# Patient Record
Sex: FEMALE | Race: OTHER
[De-identification: names, ages, dates, MRNs, and addresses within clinical notes are randomized per-mention and may not be internally consistent; named-entity substitution may affect disease eponyms.]

---

## 2017-07-02 ENCOUNTER — HOSPITAL ENCOUNTER (EMERGENCY)
Dept: HOSPITAL 61 - ER | Age: 22
Discharge: HOME | End: 2017-07-02
Payer: SELF-PAY

## 2017-07-02 VITALS — SYSTOLIC BLOOD PRESSURE: 119 MMHG | DIASTOLIC BLOOD PRESSURE: 69 MMHG

## 2017-07-02 VITALS — BODY MASS INDEX: 32.96 KG/M2 | WEIGHT: 210 LBS | HEIGHT: 67 IN

## 2017-07-02 DIAGNOSIS — R50.9: ICD-10-CM

## 2017-07-02 DIAGNOSIS — A09: Primary | ICD-10-CM

## 2017-07-02 LAB
ALBUMIN SERPL-MCNC: 3.5 G/DL (ref 3.4–5)
ALBUMIN/GLOB SERPL: 0.9 {RATIO} (ref 1–1.7)
ALP SERPL-CCNC: 57 U/L (ref 46–116)
ALT SERPL-CCNC: 28 U/L (ref 14–59)
ANION GAP SERPL CALC-SCNC: 8 MMOL/L (ref 6–14)
AST SERPL-CCNC: 13 U/L (ref 15–37)
BACTERIA #/AREA URNS HPF: (no result) /HPF
BASOPHILS # BLD AUTO: 0 X10^3/UL (ref 0–0.2)
BASOPHILS NFR BLD: 0 % (ref 0–3)
BILIRUB SERPL-MCNC: 1.1 MG/DL (ref 0.2–1)
BILIRUB UR QL STRIP: (no result)
BUN SERPL-MCNC: 10 MG/DL (ref 7–20)
BUN/CREAT SERPL: 13 (ref 6–20)
CALCIUM SERPL-MCNC: 8.6 MG/DL (ref 8.5–10.1)
CHLORIDE SERPL-SCNC: 105 MMOL/L (ref 98–107)
CO2 SERPL-SCNC: 28 MMOL/L (ref 21–32)
CREAT SERPL-MCNC: 0.8 MG/DL (ref 0.6–1)
EOSINOPHIL NFR BLD: 1 % (ref 0–3)
ERYTHROCYTE [DISTWIDTH] IN BLOOD BY AUTOMATED COUNT: 13.6 % (ref 11.5–14.5)
GFR SERPLBLD BASED ON 1.73 SQ M-ARVRAT: 90.5 ML/MIN
GLOBULIN SER-MCNC: 3.9 G/DL (ref 2.2–3.8)
GLUCOSE SERPL-MCNC: 116 MG/DL (ref 70–99)
GLUCOSE UR STRIP-MCNC: NEGATIVE MG/DL
HCT VFR BLD CALC: 39.4 % (ref 36–47)
HGB BLD-MCNC: 13.6 G/DL (ref 12–15.5)
LYMPHOCYTES # BLD: 2 X10^3/UL (ref 1–4.8)
LYMPHOCYTES NFR BLD AUTO: 22 % (ref 24–48)
MCH RBC QN AUTO: 29 PG (ref 25–35)
MCHC RBC AUTO-ENTMCNC: 35 G/DL (ref 31–37)
MCV RBC AUTO: 83 FL (ref 79–100)
MONOCYTES NFR BLD: 7 % (ref 0–9)
NEUTROPHILS NFR BLD AUTO: 70 % (ref 31–73)
NITRITE UR QL STRIP: NEGATIVE
PH UR STRIP: 6 [PH]
PLATELET # BLD AUTO: 203 X10^3/UL (ref 140–400)
POTASSIUM SERPL-SCNC: 3.2 MMOL/L (ref 3.5–5.1)
PROT SERPL-MCNC: 7.4 G/DL (ref 6.4–8.2)
PROT UR STRIP-MCNC: 30 MG/DL
RBC # BLD AUTO: 4.74 X10^6/UL (ref 3.5–5.4)
RBC #/AREA URNS HPF: (no result) /HPF (ref 0–2)
SODIUM SERPL-SCNC: 141 MMOL/L (ref 136–145)
SP GR UR STRIP: >=1.03
SQUAMOUS #/AREA URNS LPF: (no result) /LPF
UROBILINOGEN UR-MCNC: 1 MG/DL
WBC # BLD AUTO: 9.1 X10^3/UL (ref 4–11)
WBC #/AREA URNS HPF: (no result) /HPF (ref 0–4)

## 2017-07-02 PROCEDURE — 81001 URINALYSIS AUTO W/SCOPE: CPT

## 2017-07-02 PROCEDURE — 99285 EMERGENCY DEPT VISIT HI MDM: CPT

## 2017-07-02 PROCEDURE — 81025 URINE PREGNANCY TEST: CPT

## 2017-07-02 PROCEDURE — 36415 COLL VENOUS BLD VENIPUNCTURE: CPT

## 2017-07-02 PROCEDURE — 76705 ECHO EXAM OF ABDOMEN: CPT

## 2017-07-02 PROCEDURE — 96361 HYDRATE IV INFUSION ADD-ON: CPT

## 2017-07-02 PROCEDURE — 83690 ASSAY OF LIPASE: CPT

## 2017-07-02 PROCEDURE — 80053 COMPREHEN METABOLIC PANEL: CPT

## 2017-07-02 PROCEDURE — S0028 INJECTION, FAMOTIDINE, 20 MG: HCPCS

## 2017-07-02 PROCEDURE — 96375 TX/PRO/DX INJ NEW DRUG ADDON: CPT

## 2017-07-02 PROCEDURE — 85027 COMPLETE CBC AUTOMATED: CPT

## 2017-07-02 PROCEDURE — 87086 URINE CULTURE/COLONY COUNT: CPT

## 2017-07-02 PROCEDURE — 96374 THER/PROPH/DIAG INJ IV PUSH: CPT

## 2017-07-02 NOTE — PHYS DOC
Past Medical History


Past Medical History:  No Pertinent History


Past Surgical History:  No Surgical History


Alcohol Use:  None


Drug Use:  None





Adult General


Chief Complaint


Chief Complaint:  ABDOMINAL PAIN





HPI


HPI





Patient is a 22  year old female who presents with complaint of abdominal pain, 

vomiting, diarrhea, and fever. Patient states that she has had symptoms over 

the past 3 days. Patient states that she started having symptoms after eating 

at a local restaurant. Patient states that other family members have not had 

similar symptoms. Patient states that the pain is across her abdomen and feels 

like cramping. Patient rates pain currently as 9 out of 10. Patient has taken 

no medications to help with symptoms at this time.





Review of Systems


Review of Systems





Constitutional: Fevers, chills []


Eyes: Denies change in visual acuity, redness, or eye pain []


HENT: Denies nasal congestion or sore throat []


Respiratory: Denies cough or shortness of breath []


Cardiovascular: Denies chest pain or edema []


GI: Abdominal pain, nausea, vomiting, diarrhea []


: Denies dysuria or hematuria []


Musculoskeletal: Denies back pain or joint pain []


Integument: Denies rash or skin lesions []


Neurologic: Denies headache, focal weakness or sensory changes []





Current Medications


Current Medications





Current Medications








 Medications


  (Trade)  Dose


 Ordered  Sig/Cesilia  Start Time


 Stop Time Status Last Admin


Dose Admin


 


 Famotidine


  (Pepcid)  20 mg  1X  ONCE  17 20:15


 17 20:16 DC 17 20:21


20 MG


 


 Fentanyl Citrate


  (Fentanyl 2ml


 Vial)  50 mcg  PRN Q15MIN  PRN  17 20:15


 7/3/17 20:14  17 20:22


50 MCG


 


 Ondansetron HCl


  (Zofran)  4 mg  1X  ONCE  17 20:15


 17 20:16 DC 17 20:22


4 MG


 


 Sodium Chloride  1,000 ml @ 


 1,000 mls/hr  Q1H  17 20:05


 17 21:04 DC 17 20:22


1,000 MLS/HR











Allergies


Allergies





Allergies








Coded Allergies Type Severity Reaction Last Updated Verified


 


  No Known Drug Allergies    2/21/15 No











Physical Exam


Physical Exam





Constitutional: Alert, febrile, appears in mild to moderate discomfort. []


HENT: Normocephalic, atraumatic, bilateral external ears normal, oropharynx 

moist, no oral exudates, nose normal. []


Eyes: PERRLA, EOMI, conjunctiva normal, no discharge. [] 


Neck: Normal range of motion, no tenderness, supple, no stridor. [] 


Cardiovascular:Heart rate regular rhythm, no murmur []


Lungs & Thorax:  Bilateral breath sounds clear to auscultation []


Abdomen: Bowel sounds normal, soft, diffusely tender in all 4 quadrants, no 

guarding or rebound tenderness present, no masses, no pulsatile masses. [] 


Skin: Warm, dry, no erythema, no rash. [] 


Back: No tenderness, no CVA tenderness. [] 


Extremities: No tenderness, no cyanosis, no clubbing, ROM intact, no edema. [] 


Neurologic: Alert and oriented X 3, normal motor function, normal sensory 

function, no focal deficits noted. []





Current Patient Data


Vital Signs





 Vital Signs








  Date Time  Temp Pulse Resp B/P (MAP) Pulse Ox O2 Delivery O2 Flow Rate FiO2


 


17 19:44 100.2 100 18 139/78 (98) 97 Room Air  





 100.2       








Lab Values





 Laboratory Tests








Test


  17


19:20 17


19:45 17


20:00


 


POC Urine HCG, Qualitative


  Hcg negative


(Negative) 


  


 


 


White Blood Count


  


  9.1 x10^3/uL


(4.0-11.0) 


 


 


Red Blood Count


  


  4.74 x10^6/uL


(3.50-5.40) 


 


 


Hemoglobin


  


  13.6 g/dL


(12.0-15.5) 


 


 


Hematocrit


  


  39.4 %


(36.0-47.0) 


 


 


Mean Corpuscular Volume


  


  83 fL ()


  


 


 


Mean Corpuscular Hemoglobin  29 pg (25-35)   


 


Mean Corpuscular Hemoglobin


Concent 


  35 g/dL


(31-37) 


 


 


Red Cell Distribution Width


  


  13.6 %


(11.5-14.5) 


 


 


Platelet Count


  


  203 x10^3/uL


(140-400) 


 


 


Neutrophils (%) (Auto)  70 % (31-73)   


 


Lymphocytes (%) (Auto)  22 % (24-48)  L 


 


Monocytes (%) (Auto)  7 % (0-9)   


 


Eosinophils (%) (Auto)  1 % (0-3)   


 


Basophils (%) (Auto)  0 % (0-3)   


 


Neutrophils # (Auto)


  


  6.4 x10^3uL


(1.8-7.7) 


 


 


Lymphocytes # (Auto)


  


  2.0 x10^3/uL


(1.0-4.8) 


 


 


Monocytes # (Auto)


  


  0.6 x10^3/uL


(0.0-1.1) 


 


 


Eosinophils # (Auto)


  


  0.1 x10^3/uL


(0.0-0.7) 


 


 


Basophils # (Auto)


  


  0.0 x10^3/uL


(0.0-0.2) 


 


 


Sodium Level


  


  141 mmol/L


(136-145) 


 


 


Potassium Level


  


  3.2 mmol/L


(3.5-5.1)  L 


 


 


Chloride Level


  


  105 mmol/L


() 


 


 


Carbon Dioxide Level


  


  28 mmol/L


(21-32) 


 


 


Anion Gap  8 (6-14)   


 


Blood Urea Nitrogen


  


  10 mg/dL


(7-20) 


 


 


Creatinine


  


  0.8 mg/dL


(0.6-1.0) 


 


 


Estimated GFR


(Cockcroft-Gault) 


  90.5  


  


 


 


BUN/Creatinine Ratio  13 (6-20)   


 


Glucose Level


  


  116 mg/dL


(70-99)  H 


 


 


Calcium Level


  


  8.6 mg/dL


(8.5-10.1) 


 


 


Total Bilirubin


  


  1.1 mg/dL


(0.2-1.0)  H 


 


 


Aspartate Amino Transferase


(AST) 


  13 U/L (15-37)


L 


 


 


Alanine Aminotransferase (ALT)


  


  28 U/L (14-59)


  


 


 


Alkaline Phosphatase


  


  57 U/L


() 


 


 


Total Protein


  


  7.4 g/dL


(6.4-8.2) 


 


 


Albumin


  


  3.5 g/dL


(3.4-5.0) 


 


 


Albumin/Globulin Ratio


  


  0.9 (1.0-1.7)


L 


 


 


Lipase


  


  100 U/L


() 


 


 


Urine Collection Type   Unknown  


 


Urine Color   Hali  


 


Urine Clarity   Clear  


 


Urine pH   6.0  


 


Urine Specific Gravity   >=1.030  


 


Urine Protein


  


  


  30 mg/dL


(NEG-TRACE)


 


Urine Glucose (UA)


  


  


  Negative mg/dL


(NEG)


 


Urine Ketones (Stick)


  


  


  Negative mg/dL


(NEG)


 


Urine Blood


  


  


  Negative (NEG)


 


 


Urine Nitrite


  


  


  Negative (NEG)


 


 


Urine Bilirubin   Small (NEG)  


 


Urine Urobilinogen Dipstick


  


  


  1.0 mg/dL (0.2


mg/dL)


 


Urine Leukocyte Esterase


  


  


  Negative (NEG)


 


 


Urine RBC


  


  


  Occ /HPF (0-2)


 


 


Urine WBC


  


  


  5-10 /HPF


(0-4)


 


Urine Squamous Epithelial


Cells 


  


  Mod /LPF  


 


 


Urine Bacteria


  


  


  Moderate /HPF


(0-FEW)


 


Urine Mucus   Marked /LPF  





 Laboratory Tests


17 19:45








 Laboratory Tests


17 19:45














EKG


EKG


Not performed []





Radiology/Procedures


Radiology/Procedures


 [facility]


 [address] [place] [address]


 [phone]


 


 IMAGING REPORT





 Signed





PATIENT: DYAN LUCIA ACCOUNT: [id number] MRN#: [id number]


: 1995 LOCATION: ER AGE: 22


SEX: F EXAM DT: 17 ACCESSION#: [id number]


STATUS: REG ER ORD. PHYSICIAN: DIANE DOAN MD 


REASON: upper abdominal pain, elevated bilirubin, rule out cholecystitis


PROCEDURE: ABDOMEN LTD





Exam: Limited right upper quadrant ultrasound.


 


HISTORY: Upper abdominal pain, elevated bilirubin.


 


Date of service: 2017.


 


COMPARISON: None available


 


TECHNIQUE: Real-time grayscale imaging of the right upper abdomen was 


performed and images obtained in


 


FINDINGS:


 


The liver demonstrates diffuse steatosis. Gallbladder is contracted, note 


is made the patient is not fasting. Common bile duct measures 3.2 mm. 


Pancreas is poorly visualized due to overlying bowel gas. Right kidney 


measures 11.5 x 5.1 x 4.4 cm. No hydronephrosis or nephrolithiasis. 


Segmental evaluation of the IVC and aorta appear normal.


 


IMPRESSION:


 


Diffuse hepatic steatosis, otherwise unremarkable study.


 


Electronically signed by: Ioana Tafoya MD (2017 9:51 PM)














DICTATED and SIGNED BY:     IOANA TAFOYA MD


DATE:     17 2149





CC: DIANE DOAN MD; NO PCP ~


[]





Course & Med Decision Making


Course & Med Decision Making


Pertinent Labs and Imaging studies reviewed. (See chart for details)





Patient was given IV fentanyl, Zofran, Pepcid, and IV fluids. On reevaluation, 

patient's symptoms have improved at this time. The patient will continue on 

Norco, Pepcid, and Zofran for outpatient treatment. Patient's symptoms appear 

consistent with likely viral gastroenteritis. 4 days a primary doctor and 

return to emergency department for any worsening symptoms. Patient voiced 

understanding and in agreement with treatment plan.





Dragon Disclaimer


Dragon Disclaimer


This electronic medical record was generated, in whole or in part, using a 

voice recognition dictation system.





Departure


Departure


Impression:  


 Primary Impression:  


 Abdominal pain


 Additional Impressions:  


 Nausea and vomiting


 Diarrhea


 Fever


Disposition:  01 HOME, SELF-CARE


Condition:  IMPROVED


Referrals:  


NO PCP (PCP)


Patient Instructions:  Abdominal Pain (Nonspecific), Diarrhea, Fever, Nausea 

and Vomiting





Additional Instructions:  


Follow-up to primary doctor in 3-4 days. Return to emergency department for any 

worsening symptoms.


Scripts


Ondansetron (ZOFRAN ODT) 4 Mg Tab.rapdis


1 TAB SL Q8HRS Y for NAUSEA/VOMITING, #15 TAB


   Prov: DIANE DOAN MD         17 


Famotidine (PEPCID) 20 Mg Tablet


20 MG PO BID, #30 TAB


   Prov: DIANE DOAN MD         17 


Hydrocodone/Apap 5-325 (NORCO 5-325 TABLET) 1 Each Tablet


1-2 TAB PO Q4-6HRS Y for PAIN, #15 TAB


   Prov: DIANE DOAN MD         17





Problem Qualifiers








 Primary Impression:  


 Abdominal pain


 Abdominal location:  generalized  Qualified Codes:  R10.84 - Generalized 

abdominal pain


 Additional Impressions:  


 Nausea and vomiting


 Vomiting type:  unspecified  Vomiting Intractability:  unspecified  Qualified 

Codes:  R11.2 - Nausea with vomiting, unspecified


 Diarrhea


 Diarrhea type:  presumed infectious  Qualified Codes:  A09 - Infectious 

gastroenteritis and colitis, unspecified


 Fever


 Fever type:  unspecified  Qualified Codes:  R50.9 - Fever, unspecified








DIANE DOAN MD 2017 22:49

## 2017-07-02 NOTE — RAD
Exam: Limited right upper quadrant ultrasound.

 

HISTORY: Upper abdominal pain, elevated bilirubin.

 

Date of service: 7/2/2017.

 

COMPARISON: None available

 

TECHNIQUE: Real-time grayscale imaging of the right upper abdomen was 

performed and images obtained in

 

FINDINGS:

 

The liver demonstrates diffuse steatosis. Gallbladder is contracted, note 

is made the patient is not fasting. Common bile duct measures 3.2 mm. 

Pancreas is poorly visualized due to overlying bowel gas. Right kidney 

measures 11.5 x 5.1 x 4.4 cm. No hydronephrosis or nephrolithiasis. 

Segmental evaluation of the IVC and aorta appear normal.

 

IMPRESSION:

 

Diffuse hepatic steatosis, otherwise unremarkable study.

 

Electronically signed by: Ioana Veras MD (7/2/2017 9:51 PM)

## 2018-03-13 ENCOUNTER — HOSPITAL ENCOUNTER (EMERGENCY)
Dept: HOSPITAL 61 - ER | Age: 23
Discharge: HOME | End: 2018-03-13
Payer: COMMERCIAL

## 2018-03-13 DIAGNOSIS — J01.90: Primary | ICD-10-CM

## 2018-03-13 DIAGNOSIS — Z87.891: ICD-10-CM

## 2018-03-13 PROCEDURE — 99283 EMERGENCY DEPT VISIT LOW MDM: CPT

## 2019-11-05 ENCOUNTER — HOSPITAL ENCOUNTER (EMERGENCY)
Dept: HOSPITAL 61 - ER | Age: 24
LOS: 1 days | Discharge: HOME | End: 2019-11-06
Payer: MEDICAID

## 2019-11-05 VITALS — HEIGHT: 66 IN | BODY MASS INDEX: 35.36 KG/M2 | WEIGHT: 220 LBS

## 2019-11-05 DIAGNOSIS — R19.7: ICD-10-CM

## 2019-11-05 DIAGNOSIS — R21: ICD-10-CM

## 2019-11-05 DIAGNOSIS — M79.10: ICD-10-CM

## 2019-11-05 DIAGNOSIS — R05: ICD-10-CM

## 2019-11-05 DIAGNOSIS — R11.2: Primary | ICD-10-CM

## 2019-11-05 DIAGNOSIS — J45.909: ICD-10-CM

## 2019-11-05 DIAGNOSIS — R53.1: ICD-10-CM

## 2019-11-05 LAB
ANION GAP SERPL CALC-SCNC: 12 MMOL/L (ref 6–14)
BASOPHILS # BLD AUTO: 0.1 X10^3/UL (ref 0–0.2)
BASOPHILS NFR BLD: 1 % (ref 0–3)
BUN SERPL-MCNC: 11 MG/DL (ref 7–20)
BUN/CREAT SERPL: 14 (ref 6–20)
CALCIUM SERPL-MCNC: 9.4 MG/DL (ref 8.5–10.1)
CHLORIDE SERPL-SCNC: 101 MMOL/L (ref 98–107)
CO2 SERPL-SCNC: 26 MMOL/L (ref 21–32)
CREAT SERPL-MCNC: 0.8 MG/DL (ref 0.6–1)
EOSINOPHIL NFR BLD: 0.1 X10^3/UL (ref 0–0.7)
EOSINOPHIL NFR BLD: 1 % (ref 0–3)
ERYTHROCYTE [DISTWIDTH] IN BLOOD BY AUTOMATED COUNT: 13.7 % (ref 11.5–14.5)
GFR SERPLBLD BASED ON 1.73 SQ M-ARVRAT: 88.9 ML/MIN
GLUCOSE SERPL-MCNC: 94 MG/DL (ref 70–99)
HCT VFR BLD CALC: 42.9 % (ref 36–47)
HGB BLD-MCNC: 14.5 G/DL (ref 12–15.5)
INFLUENZA A PATIENT: NEGATIVE
INFLUENZA B PATIENT: NEGATIVE
LYMPHOCYTES # BLD: 1.8 X10^3/UL (ref 1–4.8)
LYMPHOCYTES NFR BLD AUTO: 14 % (ref 24–48)
MCH RBC QN AUTO: 28 PG (ref 25–35)
MCHC RBC AUTO-ENTMCNC: 34 G/DL (ref 31–37)
MCV RBC AUTO: 84 FL (ref 79–100)
MONO #: 0.6 X10^3/UL (ref 0–1.1)
MONOCYTES NFR BLD: 4 % (ref 0–9)
NEUT #: 10.4 X10^3/UL (ref 1.8–7.7)
NEUTROPHILS NFR BLD AUTO: 81 % (ref 31–73)
PLATELET # BLD AUTO: 216 X10^3/UL (ref 140–400)
POTASSIUM SERPL-SCNC: 4.2 MMOL/L (ref 3.5–5.1)
RBC # BLD AUTO: 5.14 X10^6/UL (ref 3.5–5.4)
SODIUM SERPL-SCNC: 139 MMOL/L (ref 136–145)
WBC # BLD AUTO: 12.9 X10^3/UL (ref 4–11)

## 2019-11-05 PROCEDURE — 99284 EMERGENCY DEPT VISIT MOD MDM: CPT

## 2019-11-05 PROCEDURE — 80053 COMPREHEN METABOLIC PANEL: CPT

## 2019-11-05 PROCEDURE — 85025 COMPLETE CBC W/AUTO DIFF WBC: CPT

## 2019-11-05 PROCEDURE — 87070 CULTURE OTHR SPECIMN AEROBIC: CPT

## 2019-11-05 PROCEDURE — 36415 COLL VENOUS BLD VENIPUNCTURE: CPT

## 2019-11-05 PROCEDURE — 87804 INFLUENZA ASSAY W/OPTIC: CPT

## 2019-11-05 PROCEDURE — 87880 STREP A ASSAY W/OPTIC: CPT

## 2019-11-05 PROCEDURE — 96361 HYDRATE IV INFUSION ADD-ON: CPT

## 2019-11-05 PROCEDURE — 96372 THER/PROPH/DIAG INJ SC/IM: CPT

## 2019-11-05 PROCEDURE — 96374 THER/PROPH/DIAG INJ IV PUSH: CPT

## 2019-11-05 NOTE — PHYS DOC
Past Medical History


Past Medical History:  Asthma


Past Surgical History:  No Surgical History


Alcohol Use:  Occasionally


Drug Use:  Marijuana





Adult General


Chief Complaint


Chief Complaint:  MULTIPLE COMPLAINTS





[facility]


HPI





24-year-old female presents to the emergency department complaints of body 

aches, weakness, nausea, vomiting, diarrhea, cough, rash.  Patient states 

symptoms started on Saturday. She states she's attempted over-the-counter 

medications without relief. She presents today for further evaluation. She 

denies any headache, visual change, chest pain, shortness of breath. Nothing 

makes her symptoms worse, nothing makes her symptoms better.





Review of Systems


Review of Systems





Constitutional: Denies fever or chills []


Eyes: Denies change in visual acuity, redness, or eye pain []


HENT: + congestion, sore throat


Respiratory: cough


Cardiovascular: No additional information not addressed in HPI []


GI: intermittent abdominal pain, + nausea, vomiting, diarrhea []


: Denies dysuria or hematuria []


Musculoskeletal: Denies back pain or joint pain []


Integument: rash


Neurologic: Denies headache, focal weakness or sensory changes []








All other systems were reviewed and found to be within normal limits, except as 

documented in this note.





Current Medications


Current Medications





Current Medications








 Medications


  (Trade)  Dose


 Ordered  Sig/Cesilia  Start Time


 Stop Time Status Last Admin


Dose Admin


 


 Dicyclomine HCl


  (Bentyl)  10 mg  1X  ONCE  11/5/19 23:30


 11/5/19 23:31 DC 11/5/19 23:38


10 MG


 


 Ondansetron HCl


  (Zofran)  4 mg  1X  ONCE  11/5/19 23:30


 11/5/19 23:31 DC 11/5/19 23:38


4 MG


 


 Sodium Chloride  1,000 ml @ 


 1,000 mls/hr  1X  ONCE  11/5/19 23:15


 11/6/19 00:14 DC 11/5/19 23:37


1,000 MLS/HR











Allergies


Allergies





Allergies








Coded Allergies Type Severity Reaction Last Updated Verified


 


  No Known Drug Allergies    2/21/15 No











Physical Exam


Physical Exam





Constitutional: Well developed, well nourished, no acute distress, non-toxic 

appearance. []


HENT: Normocephalic, atraumatic, bilateral external ears normal, oropharynx 

moist, no oral exudates, nose normal. []


Eyes: PERRLA, EOMI, conjunctiva normal, no discharge. [] 


Neck: Normal range of motion, no tenderness, supple, no stridor. [] 


Cardiovascular:Heart rate regular rhythm, no murmur []


Lungs & Thorax:  Bilateral breath sounds clear to auscultation []


Abdomen: Bowel sounds normal, soft, no tenderness, no masses, no pulsatile 

masses. [] 


Skin: Warm, dry, no erythema


Back: No tenderness, no CVA tenderness. [] 


Extremities: No tenderness, no edema. [] 


Neurologic: Alert and oriented X 3, no focal deficits noted. []


Psychologic: Affect normal, judgement normal, mood normal. []





Current Patient Data


Vital Signs





                                   Vital Signs








  Date Time  Temp Pulse Resp B/P (MAP) Pulse Ox O2 Delivery O2 Flow Rate FiO2


 


11/5/19 22:40 98.0 110 20 144/96 (112) 98 Room Air  





 98.0       








Lab Values





                                Laboratory Tests








Test


 11/5/19


23:10 11/5/19


23:35


 


Influenza Type A Antigen


 Negative


(NEGATIVE) 





 


Influenza Type B Antigen


 Negative


(NEGATIVE) 





 


White Blood Count


 


 12.9 x10^3/uL


(4.0-11.0)  H


 


Red Blood Count


 


 5.14 x10^6/uL


(3.50-5.40)


 


Hemoglobin


 


 14.5 g/dL


(12.0-15.5)


 


Hematocrit


 


 42.9 %


(36.0-47.0)


 


Mean Corpuscular Volume


 


 84 fL ()





 


Mean Corpuscular Hemoglobin  28 pg (25-35)  


 


Mean Corpuscular Hemoglobin


Concent 


 34 g/dL


(31-37)


 


Red Cell Distribution Width


 


 13.7 %


(11.5-14.5)


 


Platelet Count


 


 216 x10^3/uL


(140-400)


 


Neutrophils (%) (Auto)  81 % (31-73)  H


 


Lymphocytes (%) (Auto)  14 % (24-48)  L


 


Monocytes (%) (Auto)  4 % (0-9)  


 


Eosinophils (%) (Auto)  1 % (0-3)  


 


Basophils (%) (Auto)  1 % (0-3)  


 


Neutrophils # (Auto)


 


 10.4 x10^3/uL


(1.8-7.7)  H


 


Lymphocytes # (Auto)


 


 1.8 x10^3/uL


(1.0-4.8)


 


Monocytes # (Auto)


 


 0.6 x10^3/uL


(0.0-1.1)


 


Eosinophils # (Auto)


 


 0.1 x10^3/uL


(0.0-0.7)


 


Basophils # (Auto)


 


 0.1 x10^3/uL


(0.0-0.2)


 


Sodium Level


 


 139 mmol/L


(136-145)


 


Potassium Level


 


 4.2 mmol/L


(3.5-5.1)


 


Chloride Level


 


 101 mmol/L


()


 


Carbon Dioxide Level


 


 26 mmol/L


(21-32)


 


Anion Gap  12 (6-14)  


 


Blood Urea Nitrogen


 


 11 mg/dL


(7-20)


 


Creatinine


 


 0.8 mg/dL


(0.6-1.0)


 


Estimated GFR


(Cockcroft-Gault) 


 88.9  





 


BUN/Creatinine Ratio  14 (6-20)  


 


Glucose Level


 


 94 mg/dL


(70-99)


 


Calcium Level


 


 9.4 mg/dL


(8.5-10.1)


 


Total Bilirubin


 


 1.2 mg/dL


(0.2-1.0)  H


 


Aspartate Amino Transferase


(AST) 


 15 U/L (15-37)





 


Alanine Aminotransferase (ALT)


 


 26 U/L (14-59)





 


Alkaline Phosphatase


 


 60 U/L


()


 


Total Protein


 


 7.8 g/dL


(6.4-8.2)


 


Albumin


 


 3.9 g/dL


(3.4-5.0)


 


Albumin/Globulin Ratio  1.0 (1.0-1.7)  





                                Laboratory Tests


11/5/19 23:35








                                Laboratory Tests


11/5/19 23:35











EKG


EKG


[]





Radiology/Procedures


Radiology/Procedures


[]





Course & Med Decision Making


Course & Med Decision Making


Pertinent Labs and Imaging studies reviewed. (See chart for details)





[]24-year-old female presents to the emergency department complaints of body 

aches, weakness, nausea, vomiting, diarrhea, cough, rash.  Patient states 

symptoms started on Saturday. She states she's attempted over-the-counter 

medications without relief. She presents today for further evaluation. She 

denies any headache, visual change, chest pain, shortness of breath. Nothing 

makes her symptoms worse, nothing makes her symptoms better.





Labs reviewed


Strep and Influenza negative


IVF, bentyl, zofran with improvement of symptoms





Reassessment 0023 - patient states she feels better on exam


Recommend dc home and follow up with establishment of care as outpatient


Return precautions discussed





Dragon Disclaimer


Dragon Disclaimer


This electronic medical record was generated, in whole or in part, using a voice

 recognition dictation system.





Departure


Departure


Impression:  


   Primary Impression:  


   Nausea & vomiting


   Additional Impression:  


   Myalgia


Disposition:  01 HOME, SELF-CARE


Condition:  IMPROVED


Referrals:  


NO PCP (PCP)


Patient Instructions:  Myalgia, Adult, Nausea and Vomiting, Easy-to-Read





Additional Instructions:  


Recommend follow up with PCP 3 - 5 days


Return to the ER with worsening symptoms, intractable pain, fever, altered 

mental status


Tylenol/Motrin as needed for pain


Take new medications as prescribed


Scripts


Dicyclomine Hcl (DICYCLOMINE HCL) 20 Mg Tablet


1 TAB PO TID for 7 Days, #21 TAB 1 Refill


   Prov: MAYLIN MANUEL MD         11/6/19 


Ondansetron Hcl (ZOFRAN) 4 Mg Tablet


1 TAB PO PRN Q6-8HRS, #12 TAB


   Prov: MAYLIN MANUEL MD         11/6/19





Problem Qualifiers








   Primary Impression:  


   Nausea & vomiting


   Vomiting type:  unspecified  Vomiting Intractability:  unspecified  Qualified

    Codes:  R11.2 - Nausea with vomiting, unspecified








MAYLIN MANUEL MD               Nov 5, 2019 23:17

## 2019-11-06 VITALS — SYSTOLIC BLOOD PRESSURE: 120 MMHG | DIASTOLIC BLOOD PRESSURE: 67 MMHG

## 2019-11-06 LAB
ALBUMIN SERPL-MCNC: 3.9 G/DL (ref 3.4–5)
ALBUMIN/GLOB SERPL: 1 {RATIO} (ref 1–1.7)
ALP SERPL-CCNC: 60 U/L (ref 46–116)
ALT SERPL-CCNC: 26 U/L (ref 14–59)
AST SERPL-CCNC: 15 U/L (ref 15–37)
BILIRUB SERPL-MCNC: 1.2 MG/DL (ref 0.2–1)
GLOBULIN SER-MCNC: 3.9 G/DL (ref 2.2–3.8)
PROT SERPL-MCNC: 7.8 G/DL (ref 6.4–8.2)

## 2020-07-20 ENCOUNTER — HOSPITAL ENCOUNTER (EMERGENCY)
Dept: HOSPITAL 61 - ER | Age: 25
Discharge: HOME | End: 2020-07-20
Payer: MEDICAID

## 2020-07-20 VITALS — SYSTOLIC BLOOD PRESSURE: 145 MMHG | DIASTOLIC BLOOD PRESSURE: 79 MMHG

## 2020-07-20 VITALS — BODY MASS INDEX: 34.6 KG/M2 | HEIGHT: 67 IN | WEIGHT: 220.46 LBS

## 2020-07-20 DIAGNOSIS — F17.210: ICD-10-CM

## 2020-07-20 DIAGNOSIS — H10.33: Primary | ICD-10-CM

## 2020-07-20 DIAGNOSIS — J45.909: ICD-10-CM

## 2020-07-20 PROCEDURE — 99283 EMERGENCY DEPT VISIT LOW MDM: CPT

## 2020-07-20 NOTE — PHYS DOC
Past Medical History


Past Medical History:  No Pertinent History, Asthma


Past Surgical History:  No Surgical History


Smoking Status:  Current Every Day Smoker


Additional Information:  


SMOKES 2 CIGARETTES A DAY


Alcohol Use:  Occasionally


Drug Use:  Marijuana





General Adult


EDM:


Chief Complaint:  EYE PROBLEMS





HPI:


HPI:





Patient is a 25  year old female who presents to the emergency department with 

complaints of bilateral eyelid crusting, erythema, itching, and swelling for the

last 2 weeks.  Patient reports no injury.  She currently denies any pain.





Review of Systems:


Review of Systems:


Constitutional:   Denies fever or chills. []


Eyes:   See HPI


HENT:   Denies nasal congestion or sore throat. [] 


Respiratory:   Denies cough or shortness of breath. [] 


Integument: Reports erythema to bilateral upper and lower eyelids


Neurologic:   Denies headache


Psychiatric:  Denies depression or anxiety. []





Heart Score:


Risk Factors:


Risk Factors:  DM, Current or recent (<one month) smoker, HTN, HLP, family 

history of CAD, obesity.


Risk Scores:


Score 0 - 3:  2.5% MACE over next 6 weeks - Discharge Home


Score 4 - 6:  20.3% MACE over next 6 weeks - Admit for Clinical Observation


Score 7 - 10:  72.7% MACE over next 6 weeks - Early Invasive Strategies





Allergies:


Allergies:





Allergies








Coded Allergies Type Severity Reaction Last Updated Verified


 


  No Known Drug Allergies    2/21/15 No











Physical Exam:


PE:





Constitutional: Well developed, well nourished, no acute distress, non-toxic 

appearance. []


HENT: Normocephalic, atraumatic, bilateral external ears normal, nnormal, no 

discharge. [] ose normal. []


Eyes: PERRLA, EOMI; conjunctiva injected bilaterally with crusting, edema, and 

erythema of bilateral upper and lower eyelids consistent with conjunctivitis


Neck: Normal range of motion, no stridor. [] 


Cardiovascular:Heart rate regular rhythm


Lungs & Thorax:  Respirations even and unlabored, no retractions, no respiratory

 distress


Skin: Warm, dry, no erythema, no rash. [] 


Extremities: No cyanosis, ROM intact, no edema. [] 


Neurologic: Alert and oriented X 3, no focal deficits noted. []


Psychologic: Affect normal, judgement normal, mood normal. []





Current Patient Data:


Vital Signs:





                                   Vital Signs








  Date Time  Temp Pulse Resp B/P (MAP) Pulse Ox O2 Delivery O2 Flow Rate FiO2


 


7/20/20 14:20 98.0 74 16 145/79 (101) 96 Room Air  





 98.0       











EKG:


EKG:


[]





Radiology/Procedures:


Radiology/Procedures:


[]





Course & Med Decision Making:


Course & Med Decision Making


Pertinent Labs and Imaging studies reviewed. (See chart for details)





[]





Dragon Disclaimer:


Dragon Disclaimer:


This electronic medical record was generated, in whole or in part, using a voice

 recognition dictation system.





Departure


Departure


Impression:  


   Primary Impression:  


   Conjunctivitis


   Qualified Codes:  H10.33 - Unspecified acute conjunctivitis, bilateral


Disposition:  01 HOME, SELF-CARE


Condition:  STABLE


Referrals:  


NO PCP (PCP)


Patient Instructions:  Bacterial Conjunctivitis, Easy-to-Read





Additional Instructions:  


Fill the prescription(s) and use as directed. Apply warm, moist washcloths to 

eyes needed for comfort. Recommend use of baby shampoo to wash eyelids. Follow-

up with your primary care doctor in 1-2 days. Return to the emergency room if 

your symptoms worsen.


Scripts


Erythromycin Base (Erythromycin) 1 Gm Oint...g.


0.5 INCH OU QID for 5 Days, #1 TUBE 0 Refills


   Prov: ALEJANDRA HONG         7/20/20





Justicifation of Admission Dx:


Justifications for Admission:


Justification of Admission Dx:  N/A











ALEJANDRA HONG       Jul 20, 2020 15:05

## 2020-08-10 ENCOUNTER — HOSPITAL ENCOUNTER (EMERGENCY)
Dept: HOSPITAL 61 - ER | Age: 25
Discharge: HOME | End: 2020-08-10
Payer: MEDICAID

## 2020-08-10 VITALS — HEIGHT: 67 IN | WEIGHT: 220.46 LBS | BODY MASS INDEX: 34.6 KG/M2

## 2020-08-10 VITALS — DIASTOLIC BLOOD PRESSURE: 74 MMHG | SYSTOLIC BLOOD PRESSURE: 112 MMHG

## 2020-08-10 DIAGNOSIS — F12.90: ICD-10-CM

## 2020-08-10 DIAGNOSIS — J45.909: ICD-10-CM
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
## 2020-08-10 DIAGNOSIS — S39.012A: Primary | ICD-10-CM

## 2020-08-10 DIAGNOSIS — R10.9: ICD-10-CM

## 2020-08-10 DIAGNOSIS — F17.200: ICD-10-CM
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
## 2020-08-10 LAB
APTT PPP: YELLOW S
BACTERIA #/AREA URNS HPF: (no result) /HPF
BILIRUB UR QL STRIP: NEGATIVE
FIBRINOGEN PPP-MCNC: CLEAR MG/DL
NITRITE UR QL STRIP: NEGATIVE
PH UR STRIP: 7 [PH]
PROT UR STRIP-MCNC: NEGATIVE MG/DL
RBC #/AREA URNS HPF: (no result) /HPF (ref 0–2)
SQUAMOUS #/AREA URNS LPF: (no result) /LPF
UROBILINOGEN UR-MCNC: 0.2 MG/DL
WBC #/AREA URNS HPF: (no result) /HPF (ref 0–4)

## 2020-08-10 PROCEDURE — 96372 THER/PROPH/DIAG INJ SC/IM: CPT

## 2020-08-10 PROCEDURE — 81001 URINALYSIS AUTO W/SCOPE: CPT

## 2020-08-10 PROCEDURE — 74176 CT ABD & PELVIS W/O CONTRAST: CPT

## 2020-08-10 PROCEDURE — 99284 EMERGENCY DEPT VISIT MOD MDM: CPT

## 2020-08-10 PROCEDURE — 81025 URINE PREGNANCY TEST: CPT

## 2020-08-10 NOTE — PHYS DOC
Past Medical History


Past Medical History:  No Pertinent History, Asthma


Past Surgical History:  No Surgical History


Smoking Status:  Current Every Day Smoker


Alcohol Use:  Occasionally


Drug Use:  Marijuana





General Adult


EDM:


Chief Complaint:  FLANK PAIN





HPI:


HPI:





Patient is a 25  year old female who presents with right-sided flank pain.  She 

states this is been ongoing for the last a week to 10 days.  She was previously 

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  She states that it feels

like an achy burning pain.  She denies any urinary symptoms.  She denies vaginal

discharge or pelvic pain.  She denies any trauma.  She states pain is worse with

movement.  She denies any fevers.  She denies any chest pain, shortness of 

breath, nausea, vomiting, diarrhea, constipation.





Review of Systems:


Review of Systems:


General: Denies fever, chills, sweats, fatigue


Eyes: Denies drainage, blurred vision, eye redness


HENT: Denies rhinorrhea, sore throat, earache


Respiratory: Denies cough, shortness of breath, wheezing


Cardiac: Denies edema, palpitations, chest pain


GI: Denies abdominal pain, Nausea, vomiting reports flank pain


MSK: Denies back pain, neck pain


Skin: Denies rash, jaundice


Neuro: Denies headache, dizziness


Psychiatric: Denies SI/HI





Heart Score:


Risk Factors:


Risk Factors:  DM, Current or recent (<one month) smoker, HTN, HLP, family 

history of CAD, obesity.


Risk Scores:


Score 0 - 3:  2.5% MACE over next 6 weeks - Discharge Home


Score 4 - 6:  20.3% MACE over next 6 weeks - Admit for Clinical Observation


Score 7 - 10:  72.7% MACE over next 6 weeks - Early Invasive Strategies





Current Medications:





Current Medications








 Medications


  (Trade)  Dose


 Ordered  Sig/Cesilia  Start Time


 Stop Time Status Last Admin


Dose Admin


 


 Ketorolac


 Tromethamine


  (Toradol Im)  60 mg  1X  ONCE  8/10/20 08:45


 8/10/20 08:46 DC 8/10/20 10:39


60 MG











Allergies:


Allergies:





Allergies








Coded Allergies Type Severity Reaction Last Updated Verified


 


  No Known Drug Allergies    2/21/15 No











Physical Exam:


PE:


General: Awake, alert, NAD. Well Nourished, well hydrated. Cooperative


HEENT: Atraumatic, EOMI, PERRL, airway patent, moist oral mucosa


Neck: Supple, trachea midline


Respiratory: CTA bilaterally, normal effort, no wheezing/crackles


CV: RRR, no murmur, cap refill <2


GI: Soft, nondistended, nontender, no masses


MSK: No obvious deformities


Skin: Warm, dry, intact


Neuro: A&O x3, speech NL, sensory and motor grossly intact, no focal deficits


Psych: Normal affect, normal mood, not suicidal or homicidal





Current Patient Data:


Labs:





                                Laboratory Tests








Test


 8/10/20


07:22 8/10/20


07:31


 


Urine Collection Type Unknown   


 


Urine Color Yellow   


 


Urine Clarity Clear   


 


Urine pH


 7.0 (<5.0-8.0)


 





 


Urine Specific Gravity


 1.020


(1.000-1.030) 





 


Urine Protein


 Negative mg/dL


(NEG-TRACE) 





 


Urine Glucose (UA)


 Negative mg/dL


(NEG) 





 


Urine Ketones (Stick)


 Negative mg/dL


(NEG) 





 


Urine Blood


 Moderate (NEG)


 





 


Urine Nitrite


 Negative (NEG)


 





 


Urine Bilirubin


 Negative (NEG)


 





 


Urine Urobilinogen Dipstick


 0.2 mg/dL (0.2


mg/dL) 





 


Urine Leukocyte Esterase Small (NEG)   


 


Urine RBC


 3-5 /HPF (0-2)


 





 


Urine WBC


 1-4 /HPF (0-4)


 





 


Urine Squamous Epithelial


Cells Many /LPF  


 





 


Urine Bacteria


 Mod /HPF


(0-FEW) 





 


POC Urine HCG, Qualitative


 


 Hcg negative


(Negative)








Vital Signs:





                                   Vital Signs








  Date Time  Temp Pulse Resp B/P (MAP) Pulse Ox O2 Delivery O2 Flow Rate FiO2


 


8/10/20 07:32 98.3 88 16 140/80 (100) 97 Room Air  





 98.3       











EKG:


EKG:


[]





Radiology/Procedures:


Radiology/Procedures:


[]





Course & Med Decision Making:


Course & Med Decision Making


Pertinent Labs and Imaging studies reviewed. (See chart for details)





Patient is 25-year-old previously healthy female who presents the emergency room

 complaining of right flank pain.  CT abdomen pelvis without contrast was done 

and is normal.  UA is normal.  This is likely musculoskeletal.  She was given 

symptomatic care here in the emergency room.  She does not have any midline pain

 that is suggestive of spinal pathology.  Patient's test results and vitals 

while in the ED were fully reviewed and discussed with the patient. Patient is 

stable and at this time does not need admission to the hospital. We have 

discussed strict return precautions and the importance of following up with 

their Primary Care Physician. Patient stated understanding and was given an 

opportunity to ask any questions. Patient is in agreement with plan.





Dragon Disclaimer:


Dragon Disclaimer:


This electronic medical record was generated, in whole or in part, using a voice

 recognition dictation system.





Departure


Departure


Impression:  


   Primary Impression:  


   Flank pain


   Additional Impression:  


   Strain of back


Disposition:  01 HOME, SELF-CARE


Condition:  STABLE


Referrals:  


NO PCP (PCP)


Patient Instructions:  Musculoskeletal Pain


Scripts


Methocarbamol (ROBAXIN-750) 750 Mg Tablet


750 MG PO QID for 7 Days, #15 TAB


   Prov: CHARLIE ESTRADA MD         8/10/20





Justicifation of Admission Dx:


Justifications for Admission:


Justification of Admission Dx:  No











CHARLIE ESTRADA MD              Aug 10, 2020 11:11

## 2020-08-10 NOTE — RAD
CT Abdomen and Pelvis without contrast  

 

History: Right flank pain for one week

 

Technique: Noncontrast CT imaging was performed of the abdomen and pelvis.

Multiplanar images are reviewed.  Exposure: One or more of the following 

individualized dose reduction techniques were utilized for this 

examination:  1. Automated exposure control  2. Adjustment of the mA 

and/or kV according to patient size  3. Use of iterative reconstruction 

technique.

 

Comparison:  None

 

Findings: No urolithiasis or hydronephrosis is identified. No convincing 

ureteral calculus is identified, ureters poorly defined in the pelvis.

 

Accurate evaluation of abdominal visceral organs is limited without 

intravenous contrast.  There is no obvious abnormality of the spleen, 

liver, or pancreas. Gallbladder is present without obvious intraluminal 

abnormality by CT. There is no adrenal nodularity. Accurate evaluation of 

bowel is limited without oral contrast. There is no significant free air, 

free fluid, bowel dilatation. Normal caliber appendix is visualized 

without adjacent inflammatory change. There is IUD present in the uterus. 

There is a hypodense lesion of left adnexa about 1.8 cm.

 

Impression: 

 

1. There is no urolithiasis or hydronephrosis. There is no CT evidence of 

acute appendicitis.

2. There is small left adnexal cyst.

 

Electronically signed by: Christiano Lamas MD (8/10/2020 9:08 AM) [id number]

## 2022-02-24 ENCOUNTER — HOSPITAL ENCOUNTER (EMERGENCY)
Dept: HOSPITAL 61 - ER | Age: 27
Discharge: HOME | End: 2022-02-24
Payer: MEDICAID

## 2022-02-24 VITALS — WEIGHT: 220.46 LBS | HEIGHT: 67 IN | BODY MASS INDEX: 34.6 KG/M2

## 2022-02-24 VITALS — DIASTOLIC BLOOD PRESSURE: 65 MMHG | SYSTOLIC BLOOD PRESSURE: 122 MMHG

## 2022-02-24 DIAGNOSIS — J45.909: ICD-10-CM

## 2022-02-24 DIAGNOSIS — Z20.822: ICD-10-CM

## 2022-02-24 DIAGNOSIS — F17.200: ICD-10-CM

## 2022-02-24 DIAGNOSIS — J10.1: Primary | ICD-10-CM

## 2022-02-24 LAB
ANION GAP SERPL CALC-SCNC: 9 MMOL/L (ref 6–14)
BASOPHILS # BLD AUTO: 0.1 X10^3/UL (ref 0–0.2)
BASOPHILS NFR BLD: 1 % (ref 0–3)
BUN SERPL-MCNC: 9 MG/DL (ref 7–20)
CALCIUM SERPL-MCNC: 8.6 MG/DL (ref 8.5–10.1)
CHLORIDE SERPL-SCNC: 104 MMOL/L (ref 98–107)
CO2 SERPL-SCNC: 28 MMOL/L (ref 21–32)
CREAT SERPL-MCNC: 0.6 MG/DL (ref 0.6–1)
EOSINOPHIL NFR BLD: 0.2 X10^3/UL (ref 0–0.7)
EOSINOPHIL NFR BLD: 2 % (ref 0–3)
ERYTHROCYTE [DISTWIDTH] IN BLOOD BY AUTOMATED COUNT: 14 % (ref 11.5–14.5)
GFR SERPLBLD BASED ON 1.73 SQ M-ARVRAT: 121.8 ML/MIN
GLUCOSE SERPL-MCNC: 98 MG/DL (ref 70–99)
HCT VFR BLD CALC: 42.5 % (ref 36–47)
HGB BLD-MCNC: 14.1 G/DL (ref 12–15.5)
INFLUENZA A PATIENT: POSITIVE
INFLUENZA B PATIENT: NEGATIVE
LYMPHOCYTES # BLD: 2.1 X10^3/UL (ref 1–4.8)
LYMPHOCYTES NFR BLD AUTO: 24 % (ref 24–48)
MAGNESIUM SERPL-MCNC: 2.1 MG/DL (ref 1.8–2.4)
MCH RBC QN AUTO: 28 PG (ref 25–35)
MCHC RBC AUTO-ENTMCNC: 33 G/DL (ref 31–37)
MCV RBC AUTO: 85 FL (ref 79–100)
MONO #: 0.9 X10^3/UL (ref 0–1.1)
MONOCYTES NFR BLD: 10 % (ref 0–9)
NEUT #: 5.5 X10^3/UL (ref 1.8–7.7)
NEUTROPHILS NFR BLD AUTO: 63 % (ref 31–73)
PLATELET # BLD AUTO: 179 X10^3/UL (ref 140–400)
POTASSIUM SERPL-SCNC: 4.9 MMOL/L (ref 3.5–5.1)
PREG TEST PT QUAL: NEGATIVE
RBC # BLD AUTO: 5.03 X10^6/UL (ref 3.5–5.4)
SODIUM SERPL-SCNC: 141 MMOL/L (ref 136–145)
WBC # BLD AUTO: 8.7 X10^3/UL (ref 4–11)

## 2022-02-24 PROCEDURE — 84484 ASSAY OF TROPONIN QUANT: CPT

## 2022-02-24 PROCEDURE — 71045 X-RAY EXAM CHEST 1 VIEW: CPT

## 2022-02-24 PROCEDURE — 84703 CHORIONIC GONADOTROPIN ASSAY: CPT

## 2022-02-24 PROCEDURE — 36415 COLL VENOUS BLD VENIPUNCTURE: CPT

## 2022-02-24 PROCEDURE — 85025 COMPLETE CBC W/AUTO DIFF WBC: CPT

## 2022-02-24 PROCEDURE — 99285 EMERGENCY DEPT VISIT HI MDM: CPT

## 2022-02-24 PROCEDURE — 96374 THER/PROPH/DIAG INJ IV PUSH: CPT

## 2022-02-24 PROCEDURE — 87428 SARSCOV & INF VIR A&B AG IA: CPT

## 2022-02-24 PROCEDURE — 93005 ELECTROCARDIOGRAM TRACING: CPT

## 2022-02-24 PROCEDURE — 80048 BASIC METABOLIC PNL TOTAL CA: CPT

## 2022-02-24 PROCEDURE — 83735 ASSAY OF MAGNESIUM: CPT

## 2022-02-24 NOTE — RAD
Exam: Chest one view



INDICATION: Cough, chest pain



TECHNIQUE: Frontal view of the chest



Comparisons: None



FINDINGS:

The cardiomediastinal silhouette and pulmonary vessels are within normal limits.



The lung and pleural spaces are clear.



IMPRESSION:

No acute cardiopulmonary process.



Electronically signed by: John Dumont MD (2/24/2022 9:08 PM) [facility]

## 2022-02-24 NOTE — PHYS DOC
Past Medical History


Past Medical History:  No Pertinent History, Asthma


Past Surgical History:  No Surgical History


Smoking Status:  Current Every Day Smoker


Alcohol Use:  None


Drug Use:  Marijuana





General Adult


EDM:


Chief Complaint:  CHEST WALL PAIN





HPI:


HPI:





Patient is a 26  year old female who presents with 5 to 6-day history of cough, 

subjective fevers and chills, constant, sharp mid chest pain.  No dyspnea 

reported.  No wheezing reported.  No hemoptysis or purulent sputum production 

reported.  She was seen less than 48 hours ago at [facility] 

ER for the same thing.  She reports that she had a chest x-ray and blood work 

and was discharged with albuterol and diclofenac.  She describes having had a 

Covid swab, and she had a hard time getting a hold of anyone to find out the 

results, thus insinuating that it was likely a PCR exam.  She is here requesting

to be given those results.  Also, while she checked in, she decided to have her 

6-year-old daughter also evaluated for about 4 5 days of cough, fever and a few 

episodes of posttussive emesis.  The patient's daughter has no subjective 

complaints.  Patient herself is afebrile.  The patient's daughter is febrile.  

She has not had any antipyretics in over 24 hours.  The patient denies any 

actual physical symptom changes or worsening chest pain symptoms today.  She 

denies abdominal pain, nausea, vomiting, diarrhea.  Denies urinary symptoms.  

LMP within the last month.  No recent travel, surgery, hospitalization.  She 

denies lower extremity pain, calf pain or swelling she denies exertional pain, 

dyspnea exertion, PND, orthopnea.  She reports that she has asthma, she quit 

smoking less than a month ago.  She now vapes, however.  The patient is not 

vaccinated as COVID-19.  She reports that she did receive an influenza vaccine 

in .





Review of Systems:


Review of Systems:


Constitutional:   Fevers, chills


Eyes:   Denies change in visual acuity. []


HENT:   Nasal congestion, mild sore throat.  Denies epistaxis.


Respiratory:   Dry cough, dyspnea, denies wheezing.  Denies hemoptysis.  Denies 

dyspnea exertion


Cardiovascular:   Sharp, constant chest pain.  No peripheral edema.  No syncope.

  No exertional chest pain


GI:   Denies abdominal pain, nausea, vomiting, or diarrhea


:  Denies urinary symptoms


Musculoskeletal:   Denies back pain or joint pain. [] 


Integument:   Denies rash. [] 


Neurologic:   Reports headache, denies numbness, tingling, motor weakness, 

syncope, head injury or fall


Psychiatric: Anxiety





Heart Score:


C/O Chest Pain:  Yes


HEART Score for Chest Pain:  








HEART Score for Chest Pain Response (Comments) Value


 


History Slighlty/Non-Suspicious 0


 


ECG Normal 0


 


Age < 45 0


 


Risk Factors                            1 or 2 Risk Factors 1


 


Troponin < Normal Limit 0


 


Total  1








Risk Factors:


Risk Factors:  DM, Current or recent (<one month) smoker, HTN, HLP, family 

history of CAD, obesity.


Risk Scores:


Score 0 - 3:  2.5% MACE over next 6 weeks - Discharge Home


Score 4 - 6:  20.3% MACE over next 6 weeks - Admit for Clinical Observation


Score 7 - 10:  72.7% MACE over next 6 weeks - Early Invasive Strategies





Current Medications:





Current Medications








 Medications


  (Trade)  Dose


 Ordered  Sig/Cesilia  Start Time


 Stop Time Status Last Admin


Dose Admin


 


 Benzonatate


  (Tessalon Perle)  200 mg  1X  ONCE  22 20:00


 22 20:01 DC 22 20:00


200 MG


 


 Ketorolac


 Tromethamine


  (Toradol 30mg


 Vial)  15 mg  1X  ONCE  22 20:00


 22 20:01 DC 22 20:00


15 MG











Allergies:


Allergies:





Allergies








Coded Allergies Type Severity Reaction Last Updated Verified


 


  No Known Drug Allergies    2/21/15 No











Physical Exam:


PE:





Constitutional: Well developed, well nourished, no acute distress, non-toxic 

appearance.  Anxious, crying, relatively histrionic, not ill-appearing.


HENT: Normocephalic, atraumatic, oropharynx patent and clear, mucous membranes 

are moist


Eyes: Conjunctive are unremarkable appearing, no scleral icterus


Neck: Normal range of motion, no tenderness, supple, no stridor.  Achy midline, 

no JVD, no meningismus


Cardiovascular:Heart rate regular rhythm, +2 radial and +2 posterior tibial 

pulses bilaterally, warm well perfused, no edema


Lungs & Thorax: Lungs are clear to auscultation bilaterally without rales, 

rhonchi, wheezes, stridor, no tachypnea, no retractions.  No evidence of 

respiratory distress.  Speaks in full and clear sentences.  She does 

intermittently hyperventilate, though she is easily verbally redirectable.  No 

evidence of chest wall injury or trauma.  No crepitus or step-offs or 

subcutaneous emphysema.


Abdomen: Abdomen is obese, soft, nondistended, nontender to palpation


Skin: Warm, dry, no erythema, no rash. [] 


Back: No tenderness, no CVA tenderness. [] 


Extremities: No tenderness, no cyanosis, no clubbing, ROM intact, no edema.  No 

calf tenderness.


Neurologic: She is awake, alert, oriented x3, no facial asymmetry, gross motor 

function is normal, sensation grossly normal, speech clear and fluent, gait is 

steady, no evidence of gait ataxia or antalgia.


Psychologic: Anxious, tearful, history





Current Patient Data:


Labs:





                                Laboratory Tests








Test


 22


19:50


 


White Blood Count


 8.7 x10^3/uL


(4.0-11.0)


 


Red Blood Count


 5.03 x10^6/uL


(3.50-5.40)


 


Hemoglobin


 14.1 g/dL


(12.0-15.5)


 


Hematocrit


 42.5 %


(36.0-47.0)


 


Mean Corpuscular Volume


 85 fL ()





 


Mean Corpuscular Hemoglobin 28 pg (25-35)  


 


Mean Corpuscular Hemoglobin


Concent 33 g/dL


(31-37)


 


Red Cell Distribution Width


 14.0 %


(11.5-14.5)


 


Platelet Count


 179 x10^3/uL


(140-400)


 


Neutrophils (%) (Auto) 63 % (31-73)  


 


Lymphocytes (%) (Auto) 24 % (24-48)  


 


Monocytes (%) (Auto) 10 % (0-9)  H


 


Eosinophils (%) (Auto) 2 % (0-3)  


 


Basophils (%) (Auto) 1 % (0-3)  


 


Neutrophils # (Auto)


 5.5 x10^3/uL


(1.8-7.7)


 


Lymphocytes # (Auto)


 2.1 x10^3/uL


(1.0-4.8)


 


Monocytes # (Auto)


 0.9 x10^3/uL


(0.0-1.1)


 


Eosinophils # (Auto)


 0.2 x10^3/uL


(0.0-0.7)


 


Basophils # (Auto)


 0.1 x10^3/uL


(0.0-0.2)





                                Laboratory Tests


22 19:50








Vital Signs:





                                   Vital Signs








  Date Time  Temp Pulse Resp B/P (MAP) Pulse Ox O2 Delivery O2 Flow Rate FiO2


 


22 19:46 97.9 97 22 161/85 (110) 95 Room Air  





 97.9       











EKG:


EKG:


EKG is interpreted at 1952


Rhythm is sinus


Rate is 95 bpm


Axis is normal


No STEMI





Radiology/Procedures:


Radiology/Procedures:


                                        


                                 IMAGING REPORT





                                     Signed





PATIENT: DYAN LUCIA   ACCOUNT: [id number]     MRN#: [id number]


: 1995           LOCATION: ER              AGE: 26


SEX: F                    EXAM DT: 22         ACCESSION#: [id number]


STATUS: REG ER            ORD. PHYSICIAN: CELIA HATCH DO


REASON: cough, chest pain


PROCEDURE: PORTABLE CHEST 1V





Exam: Chest one view





INDICATION: Cough, chest pain





TECHNIQUE: Frontal view of the chest





Comparisons: None





FINDINGS:


The cardiomediastinal silhouette and pulmonary vessels are within normal limits.





The lung and pleural spaces are clear.





IMPRESSION:


No acute cardiopulmonary process.





Electronically signed by: John Robles MD (2022 9:08 PM) [facility]














DICTATED and SIGNED BY:     JOHN ROBLES MD


DATE:     22 [id number] 0





Course & Med Decision Making:


Course & Med Decision Making


Pertinent Labs and Imaging studies reviewed. (See chart for details)





The patient is given p.o. Tessalon Perles and IV Toradol.  She reports feeling 

much better.  She is positive for influenza A.  Chest x-ray is unremarkable.  

The patient has recently had another emergency department work-up, less than 48 

hours ago.  EKG shows no evidence of ischemia.  Troponin is negative.  There is 

no indication further invasive exams, imaging or admission based on current 

clinical presentation.  I discussed home care instructions for influenza.  She 

may take Tylenol and/or ibuprofen as needed.  Tessalon Perle prescription will 

be given for discharge.  She indicates that she was given a prescription for 

what sounds like albuterol nebulizer solution, though she does not have a 

nebulizer machine.  I gave her a prescription for an albuterol inhaler, which 

she may use as needed for wheezing.  I told her to quit smoking and vaping.  

Return precautions are given.  She verbalizes understanding.  She is discharged 

in stable condition.





Dragon Disclaimer:


Dragon Disclaimer:


This electronic medical record was generated, in whole or in part, using a voice

 recognition dictation system.





Departure


Departure


Impression:  


   Primary Impression:  


   Influenza A


   Additional Impression:  


   Atypical chest pain


Disposition:   HOME / SELF CARE / HOMELESS


Condition:  STABLE


Referrals:  


NO PCP (PCP)


Patient Instructions:  Chest Pain (Nonspecific), Influenza A (H1N1)





Additional Instructions:  


Return to the ER for uncontrolled vomiting, dehydration, more severe chest pain,

 if you develop severe abdominal pain, start coughing up blood or for any other 

concerns.  You have influenza, which is a common and highly contagious res

piratory illness.  It is a virus, so you do not need any specific medications to

 treat this.  Please take over-the-counter Tylenol or ibuprofen for your pain.  

Use the prescribed cough medicine to help with your cough, use the inhaler as 

needed.  Please stop smoking and vaping.  Follow-up with your primary care 

physician.


Scripts


Albuterol Sulfate (VENTOLIN HFA INHALER) 18 Gm Hfa.aer.ad


2 PUFF INH Q4HRS for FOR ASTHMA, #1 EACH 2 Refills


   Prov: CELIA HATCH DO         22 


Benzonatate (BENZONATATE) 200 Mg Capsule


1 CAP PO TID for cough, #20 CAP 0 Refills


   Prov: CELIA HATCH DO         22











CELIA HATCH DO             2022 20:15

## 2022-02-25 NOTE — EKG
[facility]

              [address] [place] [address]

Test Date:    2022               Test Time:    19:49:52

Pat Name:     DYAN LUCIA             Department:   

Patient ID:   [id number]           Room:          

Gender:       F                        Technician:   

:          1995               Requested By: CELIA HATCH

Order Number: [id number]           Reading MD:   Neptali Sen

                                 Measurements

Intervals                              Axis          

Rate:         95                       P:            46

IN:           156                      QRS:          53

QRSD:         74                       T:            34

QT:           352                                    

QTc:          446                                    

                           Interpretive Statements

SINUS RHYTHM

PACS

Electronically Signed On 2022 16:27:49 CST by Neptali Sen